# Patient Record
Sex: FEMALE | Race: WHITE
[De-identification: names, ages, dates, MRNs, and addresses within clinical notes are randomized per-mention and may not be internally consistent; named-entity substitution may affect disease eponyms.]

---

## 2020-09-04 ENCOUNTER — HOSPITAL ENCOUNTER (EMERGENCY)
Dept: HOSPITAL 54 - ER | Age: 20
LOS: 1 days | Discharge: HOME | End: 2020-09-05
Payer: SELF-PAY

## 2020-09-04 VITALS — WEIGHT: 125 LBS | HEIGHT: 64 IN | BODY MASS INDEX: 21.34 KG/M2

## 2020-09-04 DIAGNOSIS — R51: Primary | ICD-10-CM

## 2020-09-04 DIAGNOSIS — R42: ICD-10-CM

## 2020-09-04 PROCEDURE — 96361 HYDRATE IV INFUSION ADD-ON: CPT

## 2020-09-04 PROCEDURE — 96374 THER/PROPH/DIAG INJ IV PUSH: CPT

## 2020-09-04 PROCEDURE — 99285 EMERGENCY DEPT VISIT HI MDM: CPT

## 2020-09-04 PROCEDURE — 96372 THER/PROPH/DIAG INJ SC/IM: CPT

## 2020-09-04 PROCEDURE — 70450 CT HEAD/BRAIN W/O DYE: CPT

## 2020-09-04 NOTE — NUR
PT AAOX4. BIBSELF C/O HEADACHE X2 DAYS. ALSO C/O SORETHROAT, TRAVELED TO 
[place] 2 WEEKS AGO. NO ACUTE DISTRESS NOTED. NO FEVER, RR EVEN AND 
UNLABORED. AWAITING MD FOR EVAL.

## 2020-09-05 VITALS — DIASTOLIC BLOOD PRESSURE: 69 MMHG | SYSTOLIC BLOOD PRESSURE: 122 MMHG

## 2020-09-05 NOTE — NUR
Patient discharged to home in stable condition. Written and verbal after care 
instructions given. Patient verbalizes understanding of instruction and RX.